# Patient Record
Sex: MALE | Race: WHITE | NOT HISPANIC OR LATINO | URBAN - METROPOLITAN AREA
[De-identification: names, ages, dates, MRNs, and addresses within clinical notes are randomized per-mention and may not be internally consistent; named-entity substitution may affect disease eponyms.]

---

## 2018-06-22 ENCOUNTER — EMERGENCY (EMERGENCY)
Facility: HOSPITAL | Age: 38
LOS: 1 days | Discharge: HOME | End: 2018-06-22
Attending: PERSONAL EMERGENCY RESPONSE ATTENDANT
Payer: SELF-PAY

## 2018-06-22 VITALS
DIASTOLIC BLOOD PRESSURE: 89 MMHG | SYSTOLIC BLOOD PRESSURE: 135 MMHG | TEMPERATURE: 98 F | OXYGEN SATURATION: 95 % | HEART RATE: 63 BPM | RESPIRATION RATE: 16 BRPM

## 2018-06-22 PROCEDURE — 99053 MED SERV 10PM-8AM 24 HR FAC: CPT

## 2018-06-22 PROCEDURE — 99283 EMERGENCY DEPT VISIT LOW MDM: CPT | Mod: 25

## 2018-06-23 PROCEDURE — 73620 X-RAY EXAM OF FOOT: CPT

## 2018-06-23 PROCEDURE — 73562 X-RAY EXAM OF KNEE 3: CPT | Mod: 26,RT

## 2018-06-23 PROCEDURE — 73620 X-RAY EXAM OF FOOT: CPT | Mod: 26,RT

## 2018-06-23 PROCEDURE — 73610 X-RAY EXAM OF ANKLE: CPT | Mod: 26,RT

## 2018-06-23 PROCEDURE — 99284 EMERGENCY DEPT VISIT MOD MDM: CPT

## 2018-06-23 PROCEDURE — 73562 X-RAY EXAM OF KNEE 3: CPT

## 2018-06-23 PROCEDURE — 73610 X-RAY EXAM OF ANKLE: CPT

## 2018-06-23 RX ORDER — IBUPROFEN 200 MG
600 TABLET ORAL ONCE
Qty: 0 | Refills: 0 | Status: COMPLETED | OUTPATIENT
Start: 2018-06-23 | End: 2018-06-23

## 2018-06-23 RX ORDER — ACETAMINOPHEN 500 MG
975 TABLET ORAL ONCE
Qty: 0 | Refills: 0 | Status: COMPLETED | OUTPATIENT
Start: 2018-06-23 | End: 2018-06-23

## 2018-06-23 RX ADMIN — Medication 600 MILLIGRAM(S): at 01:10

## 2018-06-23 RX ADMIN — Medication 975 MILLIGRAM(S): at 01:10
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
## 2018-06-23 NOTE — ED PROVIDER NOTE - CHIEF COMPLAINT
The patient is a 37y Male complaining of The patient is a 37y Male complaining of back, knee and ankle pain.

## 2018-06-23 NOTE — ED PROVIDER NOTE - PLAN OF CARE
1. Please follow up with your PMD in the next 1-2 days.  2. Your symptoms will likely persist for the next few days/week. For pain, take 1 tab of Tylenol 650 mg every 4-6 hours. You may also take Motrin 600mg every 8hrs with food for additional pain relief. Additionally you may apply intermittent ice/heat to any of the areas of pain as tolerated for additional symptom relief. Rest. Apply cold pack for 20 minutes multiple times daily. Elevate affected extremities.  3. Please return to the ED immediately if you develop any new/worsening symptoms, including headache not relieved with OTC analgesics, visual disturbances, fever/chills, weakness, numbness/tingling, or chest pain.
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
## 2018-06-23 NOTE — ED PROVIDER NOTE - EXTREMITY EXAM
RLE: No obvious deformity right knee. +ttp lateral joint line. No fibular head tenderness. Full AROM at R knee with minimal pain. R ankle: no obvious deformity. No joint laxity. no ATFL, posterior medial/lateral malleolus tenderness. R forefoot: +R 5th metatarsal tenderness. ambulating without difficulty. All other joints upper/lower extremities without obvious deformity/swelling, full AROM with 5/5 muscle strength b/l. RLE: No obvious deformity/effusion of right knee. +ttp lateral joint line. No fibular head tenderness. Full AROM at R knee with minimal pain. R ANKLE: no obvious deformity. No joint laxity. no ATFL, posterior medial/lateral malleolus tenderness. Full AROM at ankle w/intact dorsiflexion/plantar flexion. R FOOT: +R 5th metatarsal tenderness. ambulating without difficulty. All other joints upper/lower extremities without obvious deformity/swelling, full AROM with 5/5 muscle strength b/l. Intact heel and toe walk.
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
## 2018-06-23 NOTE — ED PROVIDER NOTE - MUSCULOSKELETAL NECK EXAM
Full AROM. No midline vertebral tenderness./no deformity, pain or tenderness. no restriction of movement/supple/trachea midline

## 2023-04-25 NOTE — ED PROVIDER NOTE - NEURO NEGATIVE STATEMENT, MLM
Fax received from [email or web address]s for refill of 2.5 mg tabs of warfarin.  Eva Mathew, RN  Anticoagulation Nurse - [facility], [place]     no loss of consciousness, no gait abnormality, no headache, no sensory deficits, and no weakness.

## 2024-07-12 NOTE — ED PROVIDER NOTE - NS ED MD DISPO DISCHARGE CCDA
Patient/Caregiver provided printed discharge information. PAST MEDICAL HISTORY:  Anxiety disorder     Heart disease     Hypertension     Psoriasis